# Patient Record
Sex: MALE | Race: WHITE | HISPANIC OR LATINO | Employment: FULL TIME | URBAN - METROPOLITAN AREA
[De-identification: names, ages, dates, MRNs, and addresses within clinical notes are randomized per-mention and may not be internally consistent; named-entity substitution may affect disease eponyms.]

---

## 2018-12-11 ENCOUNTER — OFFICE VISIT (OUTPATIENT)
Dept: FAMILY MEDICINE CLINIC | Facility: CLINIC | Age: 28
End: 2018-12-11
Payer: COMMERCIAL

## 2018-12-11 ENCOUNTER — APPOINTMENT (OUTPATIENT)
Dept: RADIOLOGY | Facility: MEDICAL CENTER | Age: 28
End: 2018-12-11
Payer: COMMERCIAL

## 2018-12-11 VITALS
OXYGEN SATURATION: 98 % | HEART RATE: 112 BPM | DIASTOLIC BLOOD PRESSURE: 90 MMHG | RESPIRATION RATE: 16 BRPM | TEMPERATURE: 98.5 F | BODY MASS INDEX: 41.99 KG/M2 | HEIGHT: 72 IN | WEIGHT: 310 LBS | SYSTOLIC BLOOD PRESSURE: 138 MMHG

## 2018-12-11 DIAGNOSIS — M54.16 LUMBAR BACK PAIN WITH RADICULOPATHY AFFECTING RIGHT LOWER EXTREMITY: ICD-10-CM

## 2018-12-11 DIAGNOSIS — E66.01 MORBID OBESITY (HCC): ICD-10-CM

## 2018-12-11 DIAGNOSIS — M54.16 LUMBAR BACK PAIN WITH RADICULOPATHY AFFECTING RIGHT LOWER EXTREMITY: Primary | ICD-10-CM

## 2018-12-11 DIAGNOSIS — R53.83 FATIGUE, UNSPECIFIED TYPE: ICD-10-CM

## 2018-12-11 PROCEDURE — 72110 X-RAY EXAM L-2 SPINE 4/>VWS: CPT

## 2018-12-11 PROCEDURE — 99203 OFFICE O/P NEW LOW 30 MIN: CPT | Performed by: INTERNAL MEDICINE

## 2018-12-11 RX ORDER — MELOXICAM 15 MG/1
15 TABLET ORAL DAILY
Qty: 30 TABLET | Refills: 1 | Status: SHIPPED | OUTPATIENT
Start: 2018-12-11 | End: 2019-01-30 | Stop reason: SDUPTHER

## 2018-12-11 RX ORDER — CYCLOBENZAPRINE HCL 10 MG
10 TABLET ORAL 3 TIMES DAILY PRN
Qty: 30 TABLET | Refills: 0 | Status: SHIPPED | OUTPATIENT
Start: 2018-12-11 | End: 2019-01-30 | Stop reason: SDUPTHER

## 2018-12-11 RX ORDER — IBUPROFEN 200 MG
TABLET ORAL 2 TIMES DAILY
COMMUNITY
End: 2019-02-06

## 2018-12-11 NOTE — PROGRESS NOTES
Assessment/Plan:         Diagnoses and all orders for this visit:    Lumbar back pain with radiculopathy affecting right lower extremity  -     XR spine lumbar minimum 4 views non injury; Future  -     cyclobenzaprine (FLEXERIL) 10 mg tablet; Take 1 tablet (10 mg total) by mouth 3 (three) times a day as needed for muscle spasms  -     meloxicam (MOBIC) 15 mg tablet; Take 1 tablet (15 mg total) by mouth daily    Morbid obesity (HCC)  -     Comprehensive metabolic panel; Future  -     Lipid panel; Future  -     TSH, 3rd generation with Free T4 reflex; Future    Fatigue, unspecified type  -     CBC; Future    Other orders  -     ibuprofen (MOTRIN) 200 mg tablet; Take by mouth 2 (two) times a day          Subjective:      Patient ID: Shanice Neal is a 33 y o  male  Getting low back pain and it is running down his posterior rt leg  Denies weakness or slapping foot with walking  He states he was putting a fork in the  bending over when he hurt it  It got better for a while and then it recently(6 wks ago)  He took advil  He is now taking it more frequently  The following portions of the patient's history were reviewed and updated as appropriate:   He  has no past medical history on file  He There are no active problems to display for this patient  He  has no past surgical history on file  His family history includes Coronary artery disease in his father; Diabetes in his father; Hypertension in his father and mother; Thyroid disease in his mother  He  reports that he has never smoked  He has never used smokeless tobacco  He reports that he drinks alcohol  He reports that he does not use drugs    Current Outpatient Prescriptions   Medication Sig Dispense Refill    ibuprofen (MOTRIN) 200 mg tablet Take by mouth 2 (two) times a day      cyclobenzaprine (FLEXERIL) 10 mg tablet Take 1 tablet (10 mg total) by mouth 3 (three) times a day as needed for muscle spasms 30 tablet 0    meloxicam (MOBIC) 15 mg tablet Take 1 tablet (15 mg total) by mouth daily 30 tablet 1     No current facility-administered medications for this visit  No current outpatient prescriptions on file prior to visit  No current facility-administered medications on file prior to visit  He has No Known Allergies       Review of Systems   Constitutional: Negative  HENT: Negative  Respiratory: Negative  Cardiovascular: Negative  Musculoskeletal: Positive for back pain  Objective:      /90 (BP Location: Left arm, Patient Position: Sitting, Cuff Size: Large)   Pulse (!) 112   Temp 98 5 °F (36 9 °C) (Tympanic)   Resp 16   Ht 5' 11 5" (1 816 m)   Wt (!) 141 kg (310 lb)   SpO2 98%   BMI 42 63 kg/m²          Physical Exam   Constitutional: He appears well-developed and well-nourished  Morbid obesity   HENT:   Head: Normocephalic and atraumatic  Right Ear: External ear normal    Left Ear: External ear normal    Nose: Nose normal    Mouth/Throat: Oropharynx is clear and moist    Neck: Normal range of motion  Neck supple  Cardiovascular: Normal rate, regular rhythm and normal heart sounds  Pulmonary/Chest: Effort normal and breath sounds normal    Abdominal: Soft  Bowel sounds are normal    Musculoskeletal: Normal range of motion     str 5/5 = b/l

## 2018-12-18 ENCOUNTER — APPOINTMENT (OUTPATIENT)
Dept: LAB | Facility: MEDICAL CENTER | Age: 28
End: 2018-12-18
Payer: COMMERCIAL

## 2018-12-18 DIAGNOSIS — R53.83 FATIGUE, UNSPECIFIED TYPE: ICD-10-CM

## 2018-12-18 DIAGNOSIS — E66.01 MORBID OBESITY (HCC): ICD-10-CM

## 2018-12-18 LAB
ALBUMIN SERPL BCP-MCNC: 3.8 G/DL (ref 3.5–5)
ALP SERPL-CCNC: 67 U/L (ref 46–116)
ALT SERPL W P-5'-P-CCNC: 64 U/L (ref 12–78)
ANION GAP SERPL CALCULATED.3IONS-SCNC: 5 MMOL/L (ref 4–13)
AST SERPL W P-5'-P-CCNC: 32 U/L (ref 5–45)
BILIRUB SERPL-MCNC: 0.47 MG/DL (ref 0.2–1)
BUN SERPL-MCNC: 13 MG/DL (ref 5–25)
CALCIUM SERPL-MCNC: 8.8 MG/DL (ref 8.3–10.1)
CHLORIDE SERPL-SCNC: 107 MMOL/L (ref 100–108)
CHOLEST SERPL-MCNC: 155 MG/DL (ref 50–200)
CO2 SERPL-SCNC: 25 MMOL/L (ref 21–32)
CREAT SERPL-MCNC: 0.88 MG/DL (ref 0.6–1.3)
ERYTHROCYTE [DISTWIDTH] IN BLOOD BY AUTOMATED COUNT: 12.5 % (ref 11.6–15.1)
GFR SERPL CREATININE-BSD FRML MDRD: 116 ML/MIN/1.73SQ M
GLUCOSE P FAST SERPL-MCNC: 88 MG/DL (ref 65–99)
HCT VFR BLD AUTO: 46.9 % (ref 36.5–49.3)
HDLC SERPL-MCNC: 48 MG/DL (ref 40–60)
HGB BLD-MCNC: 15.6 G/DL (ref 12–17)
LDLC SERPL CALC-MCNC: 97 MG/DL (ref 0–100)
MCH RBC QN AUTO: 29.3 PG (ref 26.8–34.3)
MCHC RBC AUTO-ENTMCNC: 33.3 G/DL (ref 31.4–37.4)
MCV RBC AUTO: 88 FL (ref 82–98)
NONHDLC SERPL-MCNC: 107 MG/DL
PLATELET # BLD AUTO: 251 THOUSANDS/UL (ref 149–390)
PMV BLD AUTO: 10.9 FL (ref 8.9–12.7)
POTASSIUM SERPL-SCNC: 4 MMOL/L (ref 3.5–5.3)
PROT SERPL-MCNC: 7.9 G/DL (ref 6.4–8.2)
RBC # BLD AUTO: 5.32 MILLION/UL (ref 3.88–5.62)
SODIUM SERPL-SCNC: 137 MMOL/L (ref 136–145)
TRIGL SERPL-MCNC: 52 MG/DL
TSH SERPL DL<=0.05 MIU/L-ACNC: 3.17 UIU/ML (ref 0.36–3.74)
WBC # BLD AUTO: 9.56 THOUSAND/UL (ref 4.31–10.16)

## 2018-12-18 PROCEDURE — 36415 COLL VENOUS BLD VENIPUNCTURE: CPT

## 2018-12-18 PROCEDURE — 80061 LIPID PANEL: CPT

## 2018-12-18 PROCEDURE — 85027 COMPLETE CBC AUTOMATED: CPT

## 2018-12-18 PROCEDURE — 80053 COMPREHEN METABOLIC PANEL: CPT

## 2018-12-18 PROCEDURE — 84443 ASSAY THYROID STIM HORMONE: CPT

## 2019-01-30 ENCOUNTER — OFFICE VISIT (OUTPATIENT)
Dept: FAMILY MEDICINE CLINIC | Facility: CLINIC | Age: 29
End: 2019-01-30
Payer: COMMERCIAL

## 2019-01-30 VITALS
TEMPERATURE: 98.1 F | RESPIRATION RATE: 16 BRPM | SYSTOLIC BLOOD PRESSURE: 132 MMHG | HEART RATE: 122 BPM | WEIGHT: 308 LBS | DIASTOLIC BLOOD PRESSURE: 94 MMHG | HEIGHT: 72 IN | BODY MASS INDEX: 41.72 KG/M2 | OXYGEN SATURATION: 98 %

## 2019-01-30 DIAGNOSIS — M54.16 LUMBAR BACK PAIN WITH RADICULOPATHY AFFECTING RIGHT LOWER EXTREMITY: Primary | ICD-10-CM

## 2019-01-30 PROCEDURE — 99213 OFFICE O/P EST LOW 20 MIN: CPT | Performed by: INTERNAL MEDICINE

## 2019-01-30 RX ORDER — CYCLOBENZAPRINE HCL 10 MG
10 TABLET ORAL 3 TIMES DAILY PRN
Qty: 30 TABLET | Refills: 0 | Status: SHIPPED | OUTPATIENT
Start: 2019-01-30 | End: 2019-04-12 | Stop reason: ALTCHOICE

## 2019-01-30 RX ORDER — MELOXICAM 15 MG/1
15 TABLET ORAL DAILY
Qty: 30 TABLET | Refills: 1 | Status: SHIPPED | OUTPATIENT
Start: 2019-01-30 | End: 2019-04-12 | Stop reason: ALTCHOICE

## 2019-01-30 NOTE — PROGRESS NOTES
Assessment/Plan:         Diagnoses and all orders for this visit:    Lumbar back pain with radiculopathy affecting right lower extremity  -     cyclobenzaprine (FLEXERIL) 10 mg tablet; Take 1 tablet (10 mg total) by mouth 3 (three) times a day as needed for muscle spasms  -     meloxicam (MOBIC) 15 mg tablet; Take 1 tablet (15 mg total) by mouth daily  -     Ambulatory referral to Physical Therapy; Future  -     Ambulatory referral to Orthopedic Surgery; Future          Subjective:      Patient ID: Lew Polo is a 33 y o  male  Pt relates he still has back pain and going down rt posterior leg  He felt better on mobic and flexeril but when he is off it returns  Denies weakness  Discussed his exam is normal   Will retreat with nsaid and flexeril and asked him to start with pt  He asks for consult in case no relief with pt  The following portions of the patient's history were reviewed and updated as appropriate: He  has no past medical history on file  He There are no active problems to display for this patient  He  has no past surgical history on file  His family history includes Coronary artery disease in his father; Diabetes in his father; Hypertension in his father and mother; Thyroid disease in his mother  He  reports that he has never smoked  He has never used smokeless tobacco  He reports that he drinks alcohol  He reports that he does not use drugs  Current Outpatient Prescriptions   Medication Sig Dispense Refill    ibuprofen (MOTRIN) 200 mg tablet Take by mouth 2 (two) times a day      meloxicam (MOBIC) 15 mg tablet Take 1 tablet (15 mg total) by mouth daily 30 tablet 1    cyclobenzaprine (FLEXERIL) 10 mg tablet Take 1 tablet (10 mg total) by mouth 3 (three) times a day as needed for muscle spasms 30 tablet 0     No current facility-administered medications for this visit        Current Outpatient Prescriptions on File Prior to Visit   Medication Sig    ibuprofen (MOTRIN) 200 mg tablet Take by mouth 2 (two) times a day     No current facility-administered medications on file prior to visit  He has No Known Allergies       Review of Systems   Constitutional: Negative  HENT: Negative  Respiratory: Negative  Cardiovascular: Negative  Musculoskeletal: Positive for back pain  Objective:      /94 (BP Location: Left arm, Patient Position: Sitting, Cuff Size: Large)   Pulse (!) 122   Temp 98 1 °F (36 7 °C) (Tympanic)   Resp 16   Ht 5' 11 5" (1 816 m)   Wt (!) 140 kg (308 lb)   SpO2 98%   BMI 42 36 kg/m²          Physical Exam   Constitutional: He appears well-developed and well-nourished  HENT:   Head: Normocephalic and atraumatic  Right Ear: External ear normal    Left Ear: External ear normal    Nose: Nose normal    Mouth/Throat: Oropharynx is clear and moist  No oropharyngeal exudate  Neck: Normal range of motion  Neck supple  Cardiovascular: Normal rate, regular rhythm and normal heart sounds  Pulmonary/Chest: Effort normal and breath sounds normal  No respiratory distress  He has no wheezes  He has no rales  Musculoskeletal:   str le 5/5 b/l - foot drop   Lymphadenopathy:     He has no cervical adenopathy

## 2019-02-06 VITALS
SYSTOLIC BLOOD PRESSURE: 138 MMHG | BODY MASS INDEX: 41.72 KG/M2 | WEIGHT: 308 LBS | HEIGHT: 72 IN | HEART RATE: 120 BPM | DIASTOLIC BLOOD PRESSURE: 85 MMHG

## 2019-02-06 DIAGNOSIS — M54.41 ACUTE RIGHT-SIDED LOW BACK PAIN WITH RIGHT-SIDED SCIATICA: ICD-10-CM

## 2019-02-06 PROCEDURE — 99243 OFF/OP CNSLTJ NEW/EST LOW 30: CPT | Performed by: EMERGENCY MEDICINE

## 2019-02-06 RX ORDER — PREDNISONE 20 MG/1
TABLET ORAL
Qty: 18 TABLET | Refills: 0 | Status: SHIPPED | OUTPATIENT
Start: 2019-02-06 | End: 2019-04-12 | Stop reason: ALTCHOICE

## 2019-02-06 NOTE — PROGRESS NOTES
Assessment/Plan:    Diagnoses and all orders for this visit:    Acute right-sided low back pain with right-sided sciatica  -     predniSONE 20 mg tablet; 1 tab PO TID x 3 days, then 1 tab PO BID x 3 days, then 1 tab PO QD x 3 days     Patient presents for right lower back pain with radiation into the right posterior lateral thigh ending at the knee  Patient does have a normal exam and is neurologically intact  I would like to place the patient on a prednisone taper and have instructed the patient to stop meloxicam or any other NSAIDs     Patient will start physical therapy as scheduled  If there is no benefit on reexamination to consider MRI of the lumbar spine to evaluate for possible right sided herniated disc  The patient is happy with this plan follow-up in 4-6 weeks  Return for 4-6 weeks  Chief Complaint:   Lower back pain    Subjective:   Patient ID: Cipriano Mendez is a 33 y o  male  NP presents referred by Dr Kendrick Ferrera for lower back pain starting 10/2018 after bending over in forward flexion putting something in the   He felt immediate pain and a sense of movement in his lower back  Since that time he complains of midline and right-sided lower back pain which radiates down the posterior lateral thigh to the knee  Denies any weakness or numbness tingling there is no changes in bowel or bladder habits  He has been prescribed meloxicam and a cyclobenzaprine which does seem to help his symptoms however he has return of symptoms when he stops these medications  He is scheduled to start physical therapy  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        Review of Systems   Constitutional: Negative for fever  Respiratory: Negative for shortness of breath  Cardiovascular: Negative for chest pain  Gastrointestinal: Negative for abdominal pain  Musculoskeletal: Positive for back pain  Neurological: Negative for weakness  All other systems reviewed and are negative        The following portions of the patient's chart were reviewed and updated as appropriate: Allergy:  No Known Allergies    History reviewed  No pertinent past medical history  History reviewed  No pertinent surgical history  Social History     Social History    Marital status: /Civil Union     Spouse name: N/A    Number of children: N/A    Years of education: N/A     Occupational History    Not on file  Social History Main Topics    Smoking status: Never Smoker    Smokeless tobacco: Never Used    Alcohol use Yes      Comment: rare    Drug use: No    Sexual activity: Not on file     Other Topics Concern    Not on file     Social History Narrative    No narrative on file       Family History   Problem Relation Age of Onset    Hypertension Mother     Thyroid disease Mother     Hypertension Father     Diabetes Father     Coronary artery disease Father        Medications:    Current Outpatient Prescriptions:     cyclobenzaprine (FLEXERIL) 10 mg tablet, Take 1 tablet (10 mg total) by mouth 3 (three) times a day as needed for muscle spasms, Disp: 30 tablet, Rfl: 0    meloxicam (MOBIC) 15 mg tablet, Take 1 tablet (15 mg total) by mouth daily, Disp: 30 tablet, Rfl: 1    predniSONE 20 mg tablet, 1 tab PO TID x 3 days, then 1 tab PO BID x 3 days, then 1 tab PO QD x 3 days, Disp: 18 tablet, Rfl: 0    There is no problem list on file for this patient  Objective:  Back Exam     Range of Motion   The patient has normal back ROM  Muscle Strength   The patient has normal back strength  Right Quadriceps:  5/5     Tests   Straight leg raise right: negative  Straight leg raise left: negative    Reflexes   Patellar: normal    Other   Toe Walk: normal  Heel Walk: normal  Sensation: normal  Gait: normal   Erythema: no back redness    Comments:  Lumbar spine nontender to palpation            Physical Exam   Constitutional: He is oriented to person, place, and time   He appears well-developed and well-nourished  HENT:   Head: Normocephalic and atraumatic  Eyes: Conjunctivae are normal    Neck: Neck supple  Pulmonary/Chest: Effort normal    Neurological: He is alert and oriented to person, place, and time  Skin: Skin is warm and dry  Psychiatric: He has a normal mood and affect  His behavior is normal    Vitals reviewed  Neurologic Exam     Mental Status   Oriented to person, place, and time  Motor Exam   Muscle bulk: normal  Overall muscle tone: normal    Strength   Right quadriceps: 5/5  Right anterior tibial: 5/5  Right posterior tibial: 5/5  Right gastroc: 5/5    Sensory Exam   Right leg light touch: normal  Left leg light touch: normal      Procedures    I have personally reviewed the written report of the pertinent studies  X-ray lumbar spine   FINDINGS:     There is no evidence of acute fracture or destructive osseous lesion      Alignment is unremarkable      No significant lumbar degenerative change noted      The pedicles appear intact      Soft tissues are unremarkable      IMPRESSION:     Normal examination

## 2019-02-06 NOTE — PATIENT INSTRUCTIONS
While taking oral steroids (Prednisone, Medrol dose pack) do not take any NSAIDs such as Advil, ibuprofen, Motrin, Aleve or naproxen  You can restart the NSAIDs after you finish the steroids  While taking oral steroids, you may experience mild side effects such as feeling jittery or flushing  Please call if your side effects are significant or you have any questions        You may take Tylenol and muscle relaxants as needed with prednisone

## 2019-02-06 NOTE — LETTER
February 6, 2019     Aniceto Habermann, DO  [address]    Patient: Jorge Bonilla   YOB: 1990   Date of Visit: 2/6/2019       Dear Dr Rafa Burrows:    Thank you for referring Jorge Bonilla to me for evaluation  Below are the relevant portions of my assessment and plan of care  If you have questions, please do not hesitate to call me  I look forward to following Shraddha Limon along with you           Sincerely,        Carolina Taylor MD        CC: No Recipients

## 2019-02-13 ENCOUNTER — EVALUATION (OUTPATIENT)
Dept: PHYSICAL THERAPY | Facility: CLINIC | Age: 29
End: 2019-02-13
Payer: COMMERCIAL

## 2019-02-13 DIAGNOSIS — G89.29 CHRONIC RIGHT-SIDED LOW BACK PAIN WITH RIGHT-SIDED SCIATICA: Primary | ICD-10-CM

## 2019-02-13 DIAGNOSIS — M54.41 CHRONIC RIGHT-SIDED LOW BACK PAIN WITH RIGHT-SIDED SCIATICA: Primary | ICD-10-CM

## 2019-02-13 PROBLEM — M54.50 LUMBAR BACK PAIN: Chronic | Status: ACTIVE | Noted: 2019-02-13

## 2019-02-13 PROCEDURE — G8990 OTHER PT/OT CURRENT STATUS: HCPCS | Performed by: PHYSICAL THERAPIST

## 2019-02-13 PROCEDURE — G8991 OTHER PT/OT GOAL STATUS: HCPCS | Performed by: PHYSICAL THERAPIST

## 2019-02-13 PROCEDURE — 97112 NEUROMUSCULAR REEDUCATION: CPT | Performed by: PHYSICAL THERAPIST

## 2019-02-13 PROCEDURE — 97110 THERAPEUTIC EXERCISES: CPT | Performed by: PHYSICAL THERAPIST

## 2019-02-13 PROCEDURE — 97161 PT EVAL LOW COMPLEX 20 MIN: CPT | Performed by: PHYSICAL THERAPIST

## 2019-02-13 NOTE — PROGRESS NOTES
PT Evaluation     Today's date: 2019  Patient name: Lew Polo  : 1990  MRN: [id number]  Referring provider: Susan Collazo DO  Dx:   Encounter Diagnosis     ICD-10-CM    1  Chronic right-sided low back pain with right-sided sciatica M54 41     G89 29                   Assessment/Plan  Lew Polo is a 33 y o  male who was referred to physical therapy for management of low back pain secondary to SIJ dysfunction/hypermobility  Primary impairments include poor gluteal/core activation, lumbar hypermobility, and reported pain that can exacerbate to 8/10 on NPRS  Consequently, patient has difficulty completing ADLs including prolonged sitting  Cecy Quintero would benefit from skilled intervention to address all deficits and improve functional capability  Patient is a good candidate for therapy, pending compliance with HEP and 2x weekly participation  Thank you for the referral and please do not hesitate to contact me with any questions or concerns regarding Hernáns care! Plan  Frequency:2x week   Duration in visits: 12  Duration in weeks: 6   Therapeutic exercise/activity, neuromuscular reeducation, manual therapy, and modalities  Patient understands and agrees to plan of care  Goals  Short Term--4 weeks  1  Patient will demonstrate good TVA/gluteal/multidi activation  2  Patient will demonstrate 1/2 point increase in hip abduction strength  3  Patient will report 2 point decrease in pain levels  Long Term--By Discharge  1  Patient will report intermittent pain that does not exacerbate >4/10   2  Patient will perform all work-related duties with <2 point increase in pain  3  Patient will sleep without disturbance secondary to low back pain  4  Patient will achieve expected FOTO score  Patient's Goal: Patient will care for his daughter without pain  Subjective  History     Symptoms  Constant low back pain that refers posterolaterally into his R knee    Pain is worse with prolonged sitting  He feels best when "moving around "  He denies any bowel/bladder/sexual dysfunction and experiences no numbness or paresthesia  He describes the pain as "throbbing" that can exacerbate to 8/10 at its worst and 3/10 at its best   He reports that he can perform all ADLs but it is very painful  He has not prior history of low back pain  Social History  Jhonatan Hewitt lives with his wife and  in a multistory home  Patient is a    Physical job duties include prolonged sitting, reaching  Describes position as "semi-manual "        Objective  GAIT: unremarkable  Squat assess: cannot squat past parallel secondary to pain  SLS: RLE: 10 sec (history of chronic ankle sprain),   LLE: 30 sec      Lumbar  % of normal   Flex  100   Extn  100   SB Left 100   SB Right 100   ROT Left 100   ROT Right 100   Repetitive testing: extension= no change    Flexion= better      MMT    Hip       L       R   Flex  5 5   Extn  4+ (inc HS act) 4- (inc HS act)   Abd  3 3-   Add  NT NT                 MMT    Knee         L        R   Flex  5 5   Extn  5 5                MMT    Ankle       L        R   PF Able to toe walk Able to toe walk   DF  Able to heel walk Able to heel walk   EHL 5 5   Inv  5 4   Ever   5 5       Myotomes (L/R):intact    Dermatome: (pinprick- L/R):  intact    Reflexes: intact  Babinski= neg     Clonus= neg  Slump test: L= neg    R= neg      Straight leg raise:   L= neg     R= neg               Transverse abdominis: Bent knee fall out= Poor supine march=Poor       Hip/SI joint:   scour= neg      sukumar = neg    capsular mobility= normal         Active SLR=   neg        Innominate position=  R anterior  Multifidus activation = poor  A/P shear test: R pos      Flowsheet Rows      Most Recent Value   PT/OT G-Codes   Current Score  65   Projected Score  75          Precautions: BMI >30    Daily Treatment Diary    Patient education re: findings, pathoanatomy, POC  Manual        Gr 5 R AI correction EMMETT Exercise Diary 2/13       TVA HEP: 10x10"       TVA+ hip ABD HEP: 20x5" GTB       clamshells HEP: 2x10 5" ea       TVA+ hip ADD        TVA+ BKFO        TVA+bridge        Standing hip ABD        Sidestepping         monster walks  Fwd/retro                                      Bike/treadmill              Modalities

## 2019-02-18 ENCOUNTER — OFFICE VISIT (OUTPATIENT)
Dept: PHYSICAL THERAPY | Facility: CLINIC | Age: 29
End: 2019-02-18
Payer: COMMERCIAL

## 2019-02-18 DIAGNOSIS — G89.29 CHRONIC RIGHT-SIDED LOW BACK PAIN WITH RIGHT-SIDED SCIATICA: Primary | ICD-10-CM

## 2019-02-18 DIAGNOSIS — M54.41 CHRONIC RIGHT-SIDED LOW BACK PAIN WITH RIGHT-SIDED SCIATICA: Primary | ICD-10-CM

## 2019-02-18 PROCEDURE — 97112 NEUROMUSCULAR REEDUCATION: CPT

## 2019-02-18 PROCEDURE — 97110 THERAPEUTIC EXERCISES: CPT

## 2019-02-18 NOTE — PROGRESS NOTES
Daily Note     Today's date: 2019  Patient name: Valerie Knowles  : 1990  MRN: [id number]  Referring provider: No ref  provider found  Dx:   Encounter Diagnosis     ICD-10-CM    1  Chronic right-sided low back pain with right-sided sciatica M54 41     G89 29               7:30 - 8:15 1:1 CF     Subjective: Pt states he is feeling much better since last session  Pt states he has very slight pain in his back rather than his right leg  Objective: See treatment diary below    Precautions: BMI >30    Daily Treatment Diary    Patient education re: findings, pathoanatomy, POC  Manual        Gr 5 R AI correction EMMETT np                                                        Exercise Diary       TVA HEP: 10x10" 10x 10"       TVA+ hip ABD HEP: 20x5" GTB 20 x 5"   GTB       clamshells HEP: 2x10 5" ea 20 x 5"       TVA+ hip ADD  20 x 5"       TVA+ BKFO  10 x ea       TVA+bridge  5" x 2 x10       Standing hip ABD  20x each      Sidestepping  RTB 2 laps        monster walks  Fwd/retro 1 lap each   RTB                                       Bike/treadmill  5 mins             Modalities                            Assessment: Tolerated treatment well with no increase in pain  Pt had min to moderate fatigue near end of session  Pt demonstrates good core control seen with table exercises  Pt required verbal cuing for proper core contraction with standing exercise and treadmill walking  Patient would benefit from continued PT      Plan: Continue per plan of care

## 2019-02-20 ENCOUNTER — OFFICE VISIT (OUTPATIENT)
Dept: PHYSICAL THERAPY | Facility: CLINIC | Age: 29
End: 2019-02-20
Payer: COMMERCIAL

## 2019-02-20 DIAGNOSIS — M54.50 LUMBAR BACK PAIN: Primary | ICD-10-CM

## 2019-02-20 PROCEDURE — 97110 THERAPEUTIC EXERCISES: CPT | Performed by: PHYSICAL THERAPIST

## 2019-02-20 PROCEDURE — 97112 NEUROMUSCULAR REEDUCATION: CPT | Performed by: PHYSICAL THERAPIST

## 2019-02-20 NOTE — PROGRESS NOTES
Daily Note     Today's date: 2019  Patient name: Barak Ramirez  : 1990  MRN: [id number]  Referring provider: Consuelo Blum DO  Dx:   Encounter Diagnosis     ICD-10-CM    1  Lumbar back pain M54 5                Subjective: Patient reports his back has been so much better  He has no had any pain going down his leg, only mild ache in his back  ( Pain typically goes down right leg and with prolonged sitting)    Objective: See treatment diary below    Precautions: BMI >30    Daily Treatment Diary    Patient education re: findings, pathoanatomy, POC  Manual      Gr 5 R AI correction EMMETT np Hold                                                        Exercise Diary      TVA HEP: 10x10" 10x 10"  10x10"     TVA+ hip ABD HEP: 20x5" GTB 20 x 5"   GTB  NP     clamshells HEP: 2x10 5" ea 20 x 5"  2x10 G TB      TVA+ hip ADD  20 x 5"  NP     TVA+ BKFO  10 x ea  10x  bl      TVA+bridge  5" x 2 x10  2x10 with G TB      Standing hip ABD  20x each np     Sidestepping  RTB 2 laps  G TB x3 laps       monster walks  Fwd/retro 1 lap each   RTB  G x 2 laps      Piriformis seated figure 4    20" x4     Piriformis knee to opp soulder   NV      Swimmers    x5 arms only, x5 legs only, 5 opp arm/leg             Bike/treadmill  5 mins  5' 1 5mph            Modalities                            Assessment: Tolerated treatment well today  He had no difficulty with added exercises  Good form noted with added swimmers (bird dogs) today  He had more difficulty with right leg/left arm and increased pelvic rotation, with occasional cues needed  Patient would benefit from continued PT  Plan: Progress treatment as tolerated

## 2019-02-25 ENCOUNTER — APPOINTMENT (OUTPATIENT)
Dept: PHYSICAL THERAPY | Facility: CLINIC | Age: 29
End: 2019-02-25
Payer: COMMERCIAL

## 2019-04-12 ENCOUNTER — OFFICE VISIT (OUTPATIENT)
Dept: FAMILY MEDICINE CLINIC | Facility: CLINIC | Age: 29
End: 2019-04-12
Payer: COMMERCIAL

## 2019-04-12 VITALS
WEIGHT: 309 LBS | SYSTOLIC BLOOD PRESSURE: 140 MMHG | HEIGHT: 72 IN | DIASTOLIC BLOOD PRESSURE: 84 MMHG | OXYGEN SATURATION: 97 % | HEART RATE: 120 BPM | TEMPERATURE: 100.2 F | RESPIRATION RATE: 16 BRPM | BODY MASS INDEX: 41.85 KG/M2

## 2019-04-12 DIAGNOSIS — J02.9 SORE THROAT: Primary | ICD-10-CM

## 2019-04-12 PROBLEM — M54.50 LUMBAR BACK PAIN: Chronic | Status: RESOLVED | Noted: 2019-02-13 | Resolved: 2019-04-12

## 2019-04-12 LAB — S PYO AG THROAT QL: NEGATIVE

## 2019-04-12 PROCEDURE — 1036F TOBACCO NON-USER: CPT | Performed by: PHYSICIAN ASSISTANT

## 2019-04-12 PROCEDURE — 87880 STREP A ASSAY W/OPTIC: CPT | Performed by: PHYSICIAN ASSISTANT

## 2019-04-12 PROCEDURE — 3008F BODY MASS INDEX DOCD: CPT | Performed by: PHYSICIAN ASSISTANT

## 2019-04-12 PROCEDURE — 99213 OFFICE O/P EST LOW 20 MIN: CPT | Performed by: PHYSICIAN ASSISTANT

## 2019-04-12 PROCEDURE — 87070 CULTURE OTHR SPECIMN AEROBIC: CPT | Performed by: PHYSICIAN ASSISTANT

## 2019-04-12 RX ORDER — AMOXICILLIN 500 MG/1
500 CAPSULE ORAL EVERY 8 HOURS SCHEDULED
Qty: 30 CAPSULE | Refills: 0 | Status: SHIPPED | OUTPATIENT
Start: 2019-04-12 | End: 2019-04-22

## 2019-04-14 LAB — BACTERIA THROAT CULT: NORMAL

## 2019-04-15 ENCOUNTER — TELEPHONE (OUTPATIENT)
Dept: FAMILY MEDICINE CLINIC | Facility: CLINIC | Age: 29
End: 2019-04-15

## 2020-01-10 ENCOUNTER — HOSPITAL ENCOUNTER (EMERGENCY)
Facility: HOSPITAL | Age: 30
Discharge: HOME | End: 2020-01-10
Attending: EMERGENCY MEDICINE | Admitting: EMERGENCY MEDICINE
Payer: COMMERCIAL

## 2020-01-10 ENCOUNTER — APPOINTMENT (EMERGENCY)
Dept: RADIOLOGY | Facility: HOSPITAL | Age: 30
End: 2020-01-10
Payer: COMMERCIAL

## 2020-01-10 VITALS
RESPIRATION RATE: 18 BRPM | BODY MASS INDEX: 44.01 KG/M2 | HEART RATE: 91 BPM | TEMPERATURE: 98.6 F | OXYGEN SATURATION: 95 % | SYSTOLIC BLOOD PRESSURE: 182 MMHG | DIASTOLIC BLOOD PRESSURE: 104 MMHG | WEIGHT: 315 LBS

## 2020-01-10 DIAGNOSIS — S39.012A ACUTE MYOFASCIAL STRAIN OF LUMBOSACRAL REGION, INITIAL ENCOUNTER: ICD-10-CM
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
## 2020-01-10 PROCEDURE — 99284 EMERGENCY DEPT VISIT MOD MDM: CPT | Performed by: PHYSICIAN ASSISTANT

## 2020-01-10 PROCEDURE — 99284 EMERGENCY DEPT VISIT MOD MDM: CPT

## 2020-01-10 PROCEDURE — 72100 X-RAY EXAM L-S SPINE 2/3 VWS: CPT

## 2020-01-10 RX ORDER — CYCLOBENZAPRINE HCL 10 MG
10 TABLET ORAL 3 TIMES DAILY PRN
Qty: 9 TABLET | Refills: 0 | Status: SHIPPED | OUTPATIENT
Start: 2020-01-10 | End: 2020-01-19

## 2020-01-10 RX ORDER — NAPROXEN 500 MG/1
500 TABLET ORAL 2 TIMES DAILY WITH MEALS
Qty: 14 TABLET | Refills: 0 | Status: SHIPPED | OUTPATIENT
Start: 2020-01-10
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
## 2020-01-13 ENCOUNTER — TELEPHONE (OUTPATIENT)
Dept: PHYSICAL THERAPY | Facility: OTHER | Age: 30
End: 2020-01-13
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
## 2020-01-15 ENCOUNTER — TELEPHONE (OUTPATIENT)
Dept: PHYSICAL THERAPY | Facility: OTHER | Age: 30
End: 2020-01-15

## 2020-01-21 ENCOUNTER — TELEPHONE (OUTPATIENT)
Dept: PHYSICAL THERAPY | Facility: OTHER | Age: 30
End: 2020-01-21

## 2020-01-21 NOTE — TELEPHONE ENCOUNTER
After explanation of the program the patient is refusing at this time  Patient was provided with our ph# and hours of business  Referral closed